# Patient Record
(demographics unavailable — no encounter records)

---

## 2025-04-15 NOTE — PLAN
[FreeTextEntry1] : Labs drawn in office today Meds reconciled #Hyperlipidemia: f/u lipids, on Atorvastatin 80 mg daily, low cholesterol diet reinforced--following with cardiologist. #Prediabetes: counseled on low carb diet, exercise. A1C, fasting glucose today #Depression, insomnia: denies any SI/HI, has good support from family, on meds as managed by psychiatrist #Acute on chronic lower back pain: following with chiropractor and orthopedist, on Turmeric capsules #Bilateral Achilles tendinosis: continue PT as guided by ortho, NSAID prn.  Flu vaccine declined COVID19 booster recommended Tetanus booster received ~7-8 years ago  F/u 6m/prn

## 2025-04-15 NOTE — HISTORY OF PRESENT ILLNESS
[FreeTextEntry1] : annual physical, c/o back pain [de-identified] : TRAY MCCOLLUM is a 47 year old male with PMHx. Depression, IBS, Hyperlipidemia who present for annual comprehensive medical evaluation.  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  Has been trying to maintain low carb, low saturated fat diet. Does not consume diary (lactose intolerance/IBS), recently started a Calcium supplement.  -Following regularly with his psychiatrist for depression, insomnia, overall feels his mood has been stable.  -Keeps busy throughout the day, has been on stationary bike 10-15 min a few times week. He desires to cut down on snacking at night.  On bike 2-3x/week, 20-30 min sessions.

## 2025-04-15 NOTE — PHYSICAL EXAM
[No Axillary Lymphadenopathy] : no axillary lymphadenopathy [Declined Rectal Exam] : declined rectal exam [Normal] : normal gait, coordination grossly intact, no focal deficits and deep tendon reflexes were 2+ and symmetric [Normal Affect] : the affect was normal [Normal Insight/Judgement] : insight and judgment were intact [de-identified] : declined

## 2025-04-15 NOTE — HEALTH RISK ASSESSMENT
[Good] : ~his/her~  mood as  good [Yes] : Yes [Monthly or less (1 pt)] : Monthly or less (1 point) [1 or 2 (0 pts)] : 1 or 2 (0 points) [Never (0 pts)] : Never (0 points) [No] : In the past 12 months have you used drugs other than those required for medical reasons? No [No falls in past year] : Patient reported no falls in the past year [0] : 1) Little interest or pleasure doing things: Not at all (0) [1] : 2) Feeling down, depressed, or hopeless for several days (1) [PHQ-2 Negative - No further assessment needed] : PHQ-2 Negative - No further assessment needed [Never] : Never [NO] : No [Patient reported colonoscopy was normal] : Patient reported colonoscopy was normal [HIV test declined] : HIV test declined [Hepatitis C test declined] : Hepatitis C test declined [None] : None [With Family] : lives with family [Employed] : employed [] :  [# Of Children ___] : has [unfilled] children [Sexually Active] : sexually active [Feels Safe at Home] : Feels safe at home [Fully functional (bathing, dressing, toileting, transferring, walking, feeding)] : Fully functional (bathing, dressing, toileting, transferring, walking, feeding) [Fully functional (using the telephone, shopping, preparing meals, housekeeping, doing laundry, using] : Fully functional and needs no help or supervision to perform IADLs (using the telephone, shopping, preparing meals, housekeeping, doing laundry, using transportation, managing medications and managing finances) [Reports normal functional visual acuity (ie: able to read med bottle)] : Reports normal functional visual acuity [Smoke Detector] : smoke detector [Safety elements used in home] : safety elements used in home [Seat Belt] :  uses seat belt [Sunscreen] : uses sunscreen [Several Days (1)] : 8.) Moving or speaking so slowly that other people could have noticed, or the opposite, moving or speaking faster than usual? Several days [Not at All (0)] : 9.) Thoughts that you would be off dead or of hurting yourself in some way? Not at all [Mild] : Severity of Depression is Mild [Somewhat Difficult] : How difficult have these problems made it for you to do your work, take care of things at home, or get along with people? Somewhat difficult [PHQ-9 Negative - No further assessment needed] : PHQ-9 Negative - No further assessment needed [de-identified] : Urologist: Dr. Mariano, Cardiologist: Dr. Miller, GI: Dr. Cruz, Derm: Dr. Lucas, Psychiatrist, Ortho Dr. Rose [Audit-CScore] : 1 [de-identified] : as above [de-identified] : as above [[id number]] : 1 [[id number]] : 5 [Change in mental status noted] : No change in mental status noted [Language] : denies difficulty with language [Behavior] : denies difficulty with behavior [Learning/Retaining New Information] : denies difficulty learning/retaining new information [Handling Complex Tasks] : denies difficulty handling complex tasks [Reasoning] : denies difficulty with reasoning [Spatial Ability and Orientation] : denies difficulty with spatial ability and orientation [Reports changes in hearing] : Reports no changes in hearing [Reports changes in vision] : Reports no changes in vision [Reports changes in dental health] : Reports no changes in dental health [TB Exposure] : is not being exposed to tuberculosis [ColonoscopyDate] : 01/25 [ColonoscopyComments] : Internal hemorrhoids---report to be faxed [FreeTextEntry2] :  [de-identified] : wears contacts, UTD with eye exam

## 2025-05-21 NOTE — HISTORY OF PRESENT ILLNESS
[FreeTextEntry8] : TRAY MCCOLLUM is a 48 year old male who presents for acute focused medical evaluation, c/o back pain. Onset ~ 1 week ago on Thursday 5/15/25 after a work session. 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 Per patient, while he was working with a student, the student slid down his recliner and sat on patient's feet and "refused" to get up off his feet after several requests. Patient then had to bend forward to lift patient (who is >200 lbs) off his feet during which he felt a sharp pain in his lower back. Paion has persisted since then. He also notes several other episodes of patient scratching him and as such he often has to "dodge" patient's hand when trying to scratch him. He saw his Chiropractor (Leandro Juarez) this morning and is planning for chiropractic treatment.  He rates the pain as ~6/10 at present, lower back feels very tight and pain currently radiates to R>L side. Denies any tingling/numbness in lower legs. Taking Naproxen 500 mg daily and Tylenol 500 mg 1-2x/day as needed, heat packs/ice, stretching.

## 2025-05-21 NOTE — PLAN
[FreeTextEntry1] : Acute low back pain--check lumbar spine xray, advised on rotating ice/heating pad 1-2x/day. Rx Methocarbamol 500 mg at daily bedtime for muscle spasm--aware of sedating potential, not to drive, etc within 8 hours of taking med. To see chiropractor for adjustments. Letter for work provided recommending to take off for 2 weeks to rest and recover.

## 2025-05-21 NOTE — PHYSICAL EXAM
[Normal] : no acute distress, well nourished, well developed and well-appearing [No Respiratory Distress] : no respiratory distress  [de-identified] : nontender on palpation of lumar spinous processes, +tenderness on palpation of b/l SI joints, tense right sided lumbar paraspinal muscles